# Patient Record
Sex: FEMALE | Race: WHITE | URBAN - METROPOLITAN AREA
[De-identification: names, ages, dates, MRNs, and addresses within clinical notes are randomized per-mention and may not be internally consistent; named-entity substitution may affect disease eponyms.]

---

## 2017-01-13 ENCOUNTER — OFFICE VISIT (OUTPATIENT)
Dept: FAMILY MEDICINE | Facility: CLINIC | Age: 42
End: 2017-01-13
Payer: COMMERCIAL

## 2017-01-13 VITALS
DIASTOLIC BLOOD PRESSURE: 88 MMHG | TEMPERATURE: 97.5 F | BODY MASS INDEX: 28.73 KG/M2 | HEART RATE: 72 BPM | SYSTOLIC BLOOD PRESSURE: 127 MMHG | OXYGEN SATURATION: 100 % | HEIGHT: 62 IN | RESPIRATION RATE: 16 BRPM | WEIGHT: 156.1 LBS

## 2017-01-13 DIAGNOSIS — L84 CALLUS OF FOOT: ICD-10-CM

## 2017-01-13 DIAGNOSIS — R22.9 LOCALIZED SUPERFICIAL SWELLING, MASS, OR LUMP: Primary | ICD-10-CM

## 2017-01-13 PROCEDURE — 99213 OFFICE O/P EST LOW 20 MIN: CPT | Performed by: FAMILY MEDICINE

## 2017-01-13 NOTE — PROGRESS NOTES
"  SUBJECTIVE:                                                    Serena Dickens is a 41 year old female who presents to clinic today for the following health issues:      lump      Duration: x 3 months     Description (location/character/radiation): left heel on the inner side- quarter size. Denies any pain    Intensity:  N/A    Accompanying signs and symptoms: none    History (similar episodes/previous evaluation): None    Precipitating or alleviating factors: None    Therapies tried and outcome: ointment -pressing the area and using a metal object to roll out the area           Patient Active Problem List   Diagnosis     CARDIOVASCULAR SCREENING; LDL GOAL LESS THAN 160       No current outpatient prescriptions on file.           ROS:  CONSTITUTIONAL:NEGATIVE for fever, chills, change in weight  CV: NEGATIVE for chest pain, palpitations or peripheral edema    Has a lump.  No pain.  Has been there about 3 months.  No injury    OBJECTIVE:                                                    /88 mmHg  Pulse 72  Temp(Src) 97.5  F (36.4  C) (Oral)  Resp 16  Ht 5' 1.75\" (1.568 m)  Wt 156 lb 1.6 oz (70.806 kg)  BMI 28.80 kg/m2  SpO2 100%  Body mass index is 28.8 kg/(m^2).  GENERAL APPEARANCE: alert and no distress  MS: Left foot, medial aspect of heel, there is a lump. This is nontender. No erythema. It is in a calloused area.  PSYCH: mentation appears normal and affect normal/bright       ASSESSMENT/PLAN:                                                      Localized superficial swelling, mass, or lump  I suspect this is part of the callus    Callus of foot  Discussed callus care.  Given patient education materials from [employer]. Also see patient instructions.  If not improving, recommend to see Podiatry.  - PODIATRY/FOOT & ANKLE SURGERY REFERRAL    Follow up prn or as previously directed.      Anjelica Hernández MD, MD  [facility]  "

## 2017-01-13 NOTE — NURSING NOTE
"Chief Complaint   Patient presents with     Musculoskeletal Problem     lump on left heel       Initial /88 mmHg  Pulse 72  Temp(Src) 97.5  F (36.4  C) (Oral)  Resp 16  Ht 5' 1.75\" (1.568 m)  Wt 156 lb 1.6 oz (70.806 kg)  BMI 28.80 kg/m2  SpO2 100% Estimated body mass index is 28.8 kg/(m^2) as calculated from the following:    Height as of this encounter: 5' 1.75\" (1.568 m).    Weight as of this encounter: 156 lb 1.6 oz (70.806 kg).  BP completed using cuff size: regular with blood pressure monitor  Nora Nichols CMA      "

## 2017-01-13 NOTE — PATIENT INSTRUCTIONS
What Are Corns and Calluses?    Corns and calluses are your body s response to friction or pressure against the skin. If your foot rubs the inside of your shoe, the affected area of skin thickens. Or, if a bone is not in the normal position, skin caught between bone and shoe or bone and ground builds up. In either case, the outer layer of skin thickens to protect the foot from unusual pressure. In many cases, corns and calluses look bad but are not harmful. However, more severe corns and calluses may become infected, destroy healthy tissue, or affect foot movement.    Corns  Corns usually grow on top of the foot, often at the toe joint. Corns can range from a slight thickening of skin to a painful soft or hard bump. They often form on top of buckled toe joints (hammer toes). If your toes curl under, corns may grow on the tips of the toes. You may also get a corn on the end of a toe if it rubs against your shoe. Corns can also grow between toes, often between the first and second toes.    Calluses  Calluses grow on the bottom of the foot or on the outer edge of a toe or heel. A callus may spread across the ball of your foot. This type of callus is usually due to a problem with a metatarsal (the long bone at the base of a toe, near the ball of the foot). A pinch callus may grow along the outer edge of the heel or the big toe. Some calluses press up into the foot instead of spreading on the outside. A callus may form a central core or plug of tissue where pressure is greatest.    9359-1007 [employer]. [address] [place] [address]. All rights reserved. This information is not intended as a substitute for professional medical care. Always follow your healthcare professional's instructions.        Treating Corns and Calluses  If your corns or calluses are mild, reducing friction may help. Different shoes, moleskin patches, or soft pads may be all the treatment you need. In more severe cases,  treating tissue buildup may require your doctor s care. Sometimes orthoses (custom-made shoe inserts) are prescribed to reduce friction and pressure.    Change Shoes  If you have corns, your doctor may suggest wearing shoes that have more toe room. This way, buckled joints are less likely to be pinched against the top of the shoe. If you have calluses, wearing a cushioned insole, arch support, or heel counter can help reduce friction.  Visit Your Doctor  In some cases, your doctor may trim away the outer layers of skin that make up the corn or callus. For a painful corn, medication may be injected beneath the built-up tissue.  Wear Orthoses  Orthoses are specially made to meet the needs of your feet. They cushion calluses or divert pressure away from these problem areas. Worn as directed, orthoses help limit existing problems and prevent new ones from forming.  If You Need Surgery  If a bone or joint is out of place, certain parts of your foot may be under too much pressure. This can cause severe corns and calluses. In such cases, surgery is often the best way to correct the problem.  Outpatient Procedures  In most cases, surgery to improve bone position is an outpatient procedure. Your doctor may cut away excess bone, reposition prominent bones, or even fuse joints. Sometimes tendons or ligaments are cut to reduce tension on a bone or joint. Your doctor will talk with you about the procedure that is best suited to your needs.    3538-4106 [employer]. [address] [place] [address], [place] [address]. All rights reserved. This information is not intended as a substitute for professional medical care. Always follow your healthcare professional's instructions.

## 2017-01-13 NOTE — MR AVS SNAPSHOT
After Visit Summary   1/13/2017    Serena Dickens    MRN: [id number]           Patient Information     Date Of Birth          1975        Visit Information        Provider Department      1/13/2017 10:30 AM [facility]jel[facility]        Today's Diagnoses     Callus of foot    -  1       Care Instructions      What Are Corns and Calluses?    Corns and calluses are your body s response to friction or pressure against the skin. If your foot rubs the inside of your shoe, the affected area of skin thickens. Or, if a bone is not in the normal position, skin caught between bone and shoe or bone and ground builds up. In either case, the outer layer of skin thickens to protect the foot from unusual pressure. In many cases, corns and calluses look bad but are not harmful. However, more severe corns and calluses may become infected, destroy healthy tissue, or affect foot movement.    Corns  Corns usually grow on top of the foot, often at the toe joint. Corns can range from a slight thickening of skin to a painful soft or hard bump. They often form on top of buckled toe joints (hammer toes). If your toes curl under, corns may grow on the tips of the toes. You may also get a corn on the end of a toe if it rubs against your shoe. Corns can also grow between toes, often between the first and second toes.    Calluses  Calluses grow on the bottom of the foot or on the outer edge of a toe or heel. A callus may spread across the ball of your foot. This type of callus is usually due to a problem with a metatarsal (the long bone at the base of a toe, near the ball of the foot). A pinch callus may grow along the outer edge of the heel or the big toe. Some calluses press up into the foot instead of spreading on the outside. A callus may form a central core or plug of tissue where pressure is greatest.    9025-4352 [employer]. [address] [place] [address]. All  rights reserved. This information is not intended as a substitute for professional medical care. Always follow your healthcare professional's instructions.        Treating Corns and Calluses  If your corns or calluses are mild, reducing friction may help. Different shoes, moleskin patches, or soft pads may be all the treatment you need. In more severe cases, treating tissue buildup may require your doctor s care. Sometimes orthoses (custom-made shoe inserts) are prescribed to reduce friction and pressure.    Change Shoes  If you have corns, your doctor may suggest wearing shoes that have more toe room. This way, buckled joints are less likely to be pinched against the top of the shoe. If you have calluses, wearing a cushioned insole, arch support, or heel counter can help reduce friction.  Visit Your Doctor  In some cases, your doctor may trim away the outer layers of skin that make up the corn or callus. For a painful corn, medication may be injected beneath the built-up tissue.  Wear Orthoses  Orthoses are specially made to meet the needs of your feet. They cushion calluses or divert pressure away from these problem areas. Worn as directed, orthoses help limit existing problems and prevent new ones from forming.  If You Need Surgery  If a bone or joint is out of place, certain parts of your foot may be under too much pressure. This can cause severe corns and calluses. In such cases, surgery is often the best way to correct the problem.  Outpatient Procedures  In most cases, surgery to improve bone position is an outpatient procedure. Your doctor may cut away excess bone, reposition prominent bones, or even fuse joints. Sometimes tendons or ligaments are cut to reduce tension on a bone or joint. Your doctor will talk with you about the procedure that is best suited to your needs.    3925-0932 [employer]. [address] [place] [address], [place] [address]. All rights reserved. This information is not intended  "as a substitute for professional medical care. Always follow your healthcare professional's instructions.              Follow-ups after your visit        Additional Services     PODIATRY/FOOT & ANKLE SURGERY REFERRAL       Your provider has referred you to: [facility] [phone]   [email or web address]    Please be aware that coverage of these services is subject to the terms and limitations of your health insurance plan.  Call member services at your health plan with any benefit or coverage questions.      Please bring the following to your appointment:  >>   Any x-rays, CTs or MRIs which have been performed.  Contact the facility where they were done to arrange for  prior to your scheduled appointment.    >>   List of current medications   >>   This referral request   >>   Any documents/labs given to you for this referral                  Who to contact     If you have questions or need follow up information about today's clinic visit or your schedule please contact [facility] directly at [phone].  Normal or non-critical lab and imaging results will be communicated to you by MyChart, letter or phone within 4 business days after the clinic has received the results. If you do not hear from us within 7 days, please contact the clinic through [employer] or phone. If you have a critical or abnormal lab result, we will notify you by phone as soon as possible.  Submit refill requests through [employer] or call your pharmacy and they will forward the refill request to us. Please allow 3 business days for your refill to be completed.          Additional Information About Your Visit        [employer] Information     [employer] lets you send messages to your doctor, view your test results, renew your prescriptions, schedule appointments and more. To sign up, go to [email or web address] . Click on \"Log in\" on the left side of the screen, which will " "take you to the Welcome page. Then click on \"Sign up Now\" on the right side of the page.     You will be asked to enter the access code listed below, as well as some personal information. Please follow the directions to create your username and password.     Your access code is: [email or web address]  Expires: 2017 11:07 AM     Your access code will  in 90 days. If you need help or a new code, please call your [place] clinic or [phone].        Care EveryWhere ID     This is your Care EveryWhere ID. This could be used by other organizations to access your [place] medical records  [id number]        Your Vitals Were     Pulse Temperature Respirations Height BMI (Body Mass Index) Pulse Oximetry    72 97.5  F (36.4  C) (Oral) 16 5' 1.75\" (1.568 m) 28.80 kg/m2 100%       Blood Pressure from Last 3 Encounters:   17 127/88   10/21/15 116/70    Weight from Last 3 Encounters:   17 156 lb 1.6 oz (70.806 kg)   10/21/15 146 lb 12.8 oz (66.588 kg)              We Performed the Following     PODIATRY/FOOT & ANKLE SURGERY REFERRAL        Primary Care Provider Office Phone # Fax #    Anjelica Hernández [phone]       [facility] [address] [address] [facility] [address]        Thank you!     Thank you for choosing [facility]  for your care. Our goal is always to provide you with excellent care. Hearing back from our patients is one way we can continue to improve our services. Please take a few minutes to complete the written survey that you may receive in the mail after your visit with us. Thank you!             Your Updated Medication List - Protect others around you: Learn how to safely use, store and throw away your medicines at www.disposemymeds.org.      Notice  As of 2017 11:07 AM    You have not been prescribed any medications.      "

## 2017-06-23 ENCOUNTER — OFFICE VISIT (OUTPATIENT)
Dept: FAMILY MEDICINE | Facility: CLINIC | Age: 42
End: 2017-06-23
Payer: COMMERCIAL

## 2017-06-23 VITALS
SYSTOLIC BLOOD PRESSURE: 110 MMHG | HEIGHT: 62 IN | DIASTOLIC BLOOD PRESSURE: 72 MMHG | OXYGEN SATURATION: 98 % | HEART RATE: 75 BPM | BODY MASS INDEX: 28.1 KG/M2 | RESPIRATION RATE: 16 BRPM | WEIGHT: 152.7 LBS | TEMPERATURE: 98 F

## 2017-06-23 DIAGNOSIS — B18.1 VIRAL HEPATITIS B CHRONIC (H): Primary | ICD-10-CM

## 2017-06-23 DIAGNOSIS — Z12.31 ENCOUNTER FOR SCREENING MAMMOGRAM FOR BREAST CANCER: ICD-10-CM

## 2017-06-23 DIAGNOSIS — Z86.2 HISTORY OF ANEMIA: ICD-10-CM

## 2017-06-23 DIAGNOSIS — E28.319 EARLY MENOPAUSE: ICD-10-CM

## 2017-06-23 LAB — AFP SERPL-MCNC: 2.9 UG/L (ref 0–8)

## 2017-06-23 PROCEDURE — 80053 COMPREHEN METABOLIC PANEL: CPT | Performed by: FAMILY MEDICINE

## 2017-06-23 PROCEDURE — 82105 ALPHA-FETOPROTEIN SERUM: CPT | Performed by: FAMILY MEDICINE

## 2017-06-23 PROCEDURE — 36415 COLL VENOUS BLD VENIPUNCTURE: CPT | Performed by: FAMILY MEDICINE

## 2017-06-23 PROCEDURE — 99214 OFFICE O/P EST MOD 30 MIN: CPT | Performed by: FAMILY MEDICINE

## 2017-06-23 NOTE — PROGRESS NOTES
"  SUBJECTIVE:                                                    Serena Dickens is a 42 year old female who presents to clinic today for the following health issues:      Here to follow up with anemia and hepatitis B virus. Check labs.        Problem list and histories reviewed & adjusted, as indicated.  Additional history:   See under ROS     Patient Active Problem List   Diagnosis     CARDIOVASCULAR SCREENING; LDL GOAL LESS THAN 160       No current outpatient prescriptions on file.           Reviewed and updated as needed this visit by clinical staff  Tobacco  Allergies  Med Hx  Surg Hx  Fam Hx  Soc Hx      Reviewed and updated as needed this visit by Provider         ROS:  CONSTITUTIONAL:NEGATIVE for fever, chills, change in weight  RESP:NEGATIVE for significant cough or SOB  CV: NEGATIVE for chest pain, palpitations or peripheral edema  GI: NEGATIVE for nausea, abdominal pain, heartburn, or change in bowel habits  PSYCHIATRIC: NEGATIVE for changes in mood or affect    History of EVERETTE.  Not having periods; stopped 18 months ago.    Notes history of Hepatitis B. She has not been to Hepatologist for awhile; would like to check things. She notes she feels well; has not symptoms and has been busy.       OBJECTIVE:                                                    /72 (BP Location: Right arm, Patient Position: Chair, Cuff Size: Adult Regular)  Pulse 75  Temp 98  F (36.7  C) (Oral)  Resp 16  Ht 5' 1.75\" (1.568 m)  Wt 152 lb 11.2 oz (69.3 kg)  SpO2 98%  BMI 28.16 kg/m2  Body mass index is 28.16 kg/(m^2).  GENERAL APPEARANCE: alert and no distress  RESP: lungs clear to auscultation - no rales, rhonchi or wheezes  CV: regular rates and rhythm  ABDOMEN: soft, nontender, without hepatosplenomegaly or masses and bowel sounds normal  MS: extremities normal- no gross deformities noted  PSYCH: mentation appears normal and affect normal/bright    Hemoglobin   Date Value Ref Range Status   10/21/2015 13.6 " 11.7 - 15.7 g/dL Final   ]    Reviewed her chart. There are copies of letters to her around results from [facility]; no consult notes. I did see dx of Hep B at one time.      ASSESSMENT/PLAN:                                                      Viral hepatitis B chronic (H)  Encourage her to follow up with [facility]. It does look like they have done ultrasound in the past; they may want to do this and have done in their office as they have more specialized software for the liver.  Also not sure if there might be more to be testing for and offering her. She is agreeable to this.  We did decide to get the following labs and send to [facility] so that this may help streamline some of the follow up.  - Comprehensive metabolic panel  - AFP tumor marker  - GASTROENTEROLOGY ADULT REF CONSULT ONLY      History of anemia  She has not had periods for awhile.  Last blood count normal.   (will see if lab can add on a hemoglobin/cbc to labs; did not order as intended).    Early menopause:  Briefly discussed this. She was not concerned. Discussed taking vitamin D around bone health.    Encounter for screening mammogram for breast cancer    - *MA Screening Digital Bilateral; Future        Patient Instructions       I would recommend taking 1000 iu vitamin D3 daily.      CC [facility] with results and note.    Anjelica Hernández MD, MD  [facility]

## 2017-06-23 NOTE — NURSING NOTE
"Chief Complaint   Patient presents with     Anemia     follow up     Hepatatitis b       Initial /72 (BP Location: Right arm, Patient Position: Chair, Cuff Size: Adult Regular)  Pulse 75  Temp 98  F (36.7  C) (Oral)  Resp 16  Ht 5' 1.75\" (1.568 m)  Wt 152 lb 11.2 oz (69.3 kg)  SpO2 98%  BMI 28.16 kg/m2 Estimated body mass index is 28.16 kg/(m^2) as calculated from the following:    Height as of this encounter: 5' 1.75\" (1.568 m).    Weight as of this encounter: 152 lb 11.2 oz (69.3 kg).  Medication Reconciliation: complete   Nora Nichols, CASSIE      "

## 2017-06-23 NOTE — MR AVS SNAPSHOT
After Visit Summary   6/23/2017    Serena Dickens    MRN: [id number]           Patient Information     Date Of Birth          1975        Visit Information        Provider Department      6/23/2017 9:10 AM Anjelica Hernández MD [facility]        Today's Diagnoses     Viral hepatitis B chronic (H)    -  1    Encounter for screening mammogram for breast cancer        History of anemia          Care Instructions        I would recommend taking 1000 iu vitamin D3 daily.          Follow-ups after your visit        Additional Services     GASTROENTEROLOGY ADULT REF CONSULT ONLY       Preferred Location: [facility] [phone]      Please be aware that coverage of these services is subject to the terms and limitations of your health insurance plan.  Call member services at your health plan with any benefit or coverage questions.  Any procedures must be performed at a [facility] facility OR coordinated by your clinic's referral office.    Please bring the following with you to your appointment:    (1) Any X-Rays, CTs or MRIs which have been performed.  Contact the facility where they were done to arrange for  prior to your scheduled appointment.    (2) List of current medications   (3) This referral request   (4) Any documents/labs given to you for this referral                  Future tests that were ordered for you today     Open Future Orders        Priority Expected Expires Ordered    *MA Screening Digital Bilateral Routine  6/23/2018 6/23/2017            Who to contact     If you have questions or need follow up information about today's clinic visit or your schedule please contact [facility] directly at [phone].  Normal or non-critical lab and imaging results will be communicated to you by MyChart, letter or phone within 4 business days after the clinic has received the results. If you do not hear from us within 7 days, please contact the clinic through  "[employer]AN[employer] or phone. If you have a critical or abnormal lab result, we will notify you by phone as soon as possible.  Submit refill requests through [employer] or call your pharmacy and they will forward the refill request to us. Please allow 3 business days for your refill to be completed.          Additional Information About Your Visit        JANZZhart Information     [employer] lets you send messages to your doctor, view your test results, renew your prescriptions, schedule appointments and more. To sign up, go to [email or web address] . Click on \"Log in\" on the left side of the screen, which will take you to the Welcome page. Then click on \"Sign up Now\" on the right side of the page.     You will be asked to enter the access code listed below, as well as some personal information. Please follow the directions to create your username and password.     Your access code is: [email or web address]  Expires: 2017  9:46 AM     Your access code will  in 90 days. If you need help or a new code, please call your [place] clinic or [phone].        Care EveryWhere ID     This is your Care EveryWhere ID. This could be used by other organizations to access your [place] medical records  [id number]        Your Vitals Were     Pulse Temperature Respirations Height Pulse Oximetry BMI (Body Mass Index)    75 98  F (36.7  C) (Oral) 16 5' 1.75\" (1.568 m) 98% 28.16 kg/m2       Blood Pressure from Last 3 Encounters:   17 110/72   17 127/88   10/21/15 116/70    Weight from Last 3 Encounters:   17 152 lb 11.2 oz (69.3 kg)   17 156 lb 1.6 oz (70.8 kg)   10/21/15 146 lb 12.8 oz (66.6 kg)              We Performed the Following     AFP tumor marker     Comprehensive metabolic panel     GASTROENTEROLOGY ADULT REF CONSULT ONLY        Primary Care Provider Office Phone # Fax #    Anjelica Hernández [phone]       [facility] [address] [facility]  [facility] [address]        Equal Access to " Services     [facility]: Hadii marcin Frances, waenedinada luqadaha, qaybta kaalmamaurisio vang, leslie carvalho. So [facility] [phone].    ATENCIÓN: Si habla español, tiene a hunt disposición servicios gratuitos de asistencia lingüística. Llame al [phone].    We comply with applicable federal civil rights laws and [place] laws. We do not discriminate on the basis of race, color, national origin, age, disability sex, sexual orientation or gender identity.            Thank you!     Thank you for choosing [facility]  for your care. Our goal is always to provide you with excellent care. Hearing back from our patients is one way we can continue to improve our services. Please take a few minutes to complete the written survey that you may receive in the mail after your visit with us. Thank you!             Your Updated Medication List - Protect others around you: Learn how to safely use, store and throw away your medicines at www.disposemymeds.org.      Notice  As of 6/23/2017  9:46 AM    You have not been prescribed any medications.

## 2017-06-24 LAB
ALBUMIN SERPL-MCNC: 3.6 G/DL (ref 3.4–5)
ALP SERPL-CCNC: 98 U/L (ref 40–150)
ALT SERPL W P-5'-P-CCNC: 89 U/L (ref 0–50)
ANION GAP SERPL CALCULATED.3IONS-SCNC: 5 MMOL/L (ref 3–14)
AST SERPL W P-5'-P-CCNC: 50 U/L (ref 0–45)
BILIRUB SERPL-MCNC: 0.9 MG/DL (ref 0.2–1.3)
BUN SERPL-MCNC: 9 MG/DL (ref 7–30)
CALCIUM SERPL-MCNC: 8.6 MG/DL (ref 8.5–10.1)
CHLORIDE SERPL-SCNC: 105 MMOL/L (ref 94–109)
CO2 SERPL-SCNC: 30 MMOL/L (ref 20–32)
CREAT SERPL-MCNC: 0.73 MG/DL (ref 0.52–1.04)
GFR SERPL CREATININE-BSD FRML MDRD: 87 ML/MIN/1.7M2
GLUCOSE SERPL-MCNC: 109 MG/DL (ref 70–99)
POTASSIUM SERPL-SCNC: 3.7 MMOL/L (ref 3.4–5.3)
PROT SERPL-MCNC: 7.7 G/DL (ref 6.8–8.8)
SODIUM SERPL-SCNC: 140 MMOL/L (ref 133–144)

## 2017-09-19 ENCOUNTER — TRANSFERRED RECORDS (OUTPATIENT)
Dept: HEALTH INFORMATION MANAGEMENT | Facility: CLINIC | Age: 42
End: 2017-09-19

## 2017-09-19 LAB — INR PPP: 1 (ref 1–1.2)

## 2017-10-13 ENCOUNTER — TRANSFERRED RECORDS (OUTPATIENT)
Dept: HEALTH INFORMATION MANAGEMENT | Facility: CLINIC | Age: 42
End: 2017-10-13

## 2017-10-19 ENCOUNTER — TRANSFERRED RECORDS (OUTPATIENT)
Dept: HEALTH INFORMATION MANAGEMENT | Facility: CLINIC | Age: 42
End: 2017-10-19

## 2017-10-20 ENCOUNTER — ALLIED HEALTH/NURSE VISIT (OUTPATIENT)
Dept: NURSING | Facility: CLINIC | Age: 42
End: 2017-10-20
Payer: COMMERCIAL

## 2017-10-20 DIAGNOSIS — Z23 NEED FOR PROPHYLACTIC VACCINATION AND INOCULATION AGAINST INFLUENZA: Primary | ICD-10-CM

## 2017-10-20 PROCEDURE — 90471 IMMUNIZATION ADMIN: CPT

## 2017-10-20 PROCEDURE — 99207 ZZC NO CHARGE NURSE ONLY: CPT

## 2017-10-20 PROCEDURE — 90686 IIV4 VACC NO PRSV 0.5 ML IM: CPT

## 2017-10-20 NOTE — MR AVS SNAPSHOT
"              After Visit Summary   10/20/2017    Serena Dickens    MRN: [id number]           Patient Information     Date Of Birth          1975        Visit Information        Provider Department      10/20/2017 11:30 AM  NURSE [facility] [place]        Today's Diagnoses     Need for prophylactic vaccination and inoculation against influenza    -  1       Follow-ups after your visit        Your next 10 appointments already scheduled     Oct 20, 2017 11:30 AM CDT   Nurse Only with  NURSE   [facility] ([facility])    [address] [facility] [address] [phone]            Oct 27, 2017  1:50 PM CDT   PHYSICAL with Anjelica Hernández MD   [facility] ([facility])    [address] [facility] [address] [facility] [address] [phone]              Who to contact     If you have questions or need follow up information about today's clinic visit or your schedule please contact [facility] directly at [phone].  Normal or non-critical lab and imaging results will be communicated to you by [email or web address], letter or phone within 4 business days after the clinic has received the results. If you do not hear from us within 7 days, please contact the clinic through [employer] or phone. If you have a critical or abnormal lab result, we will notify you by phone as soon as possible.  Submit refill requests through RapidMind or call your pharmacy and they will forward the refill request to us. Please allow 3 business days for your refill to be completed.          Additional Information About Your Visit        [email or web address] Information     RapidMind lets you send messages to your doctor, view your test results, renew your prescriptions, schedule appointments and more. To sign up, go to [email or web address] . Click on \"Log in\" on the left side of the screen, which will take you to the Welcome page. Then click on \"Sign up Now\" on the right " side of the page.     You will be asked to enter the access code listed below, as well as some personal information. Please follow the directions to create your username and password.     Your access code is: [email or web address]  Expires: 2018  8:46 AM     Your access code will  in 90 days. If you need help or a new code, please call your [place] clinic or [phone].        Care EveryWhere ID     This is your Care EveryWhere ID. This could be used by other organizations to access your [place] medical records  [id number]         Blood Pressure from Last 3 Encounters:   17 110/72   17 127/88   10/21/15 116/70    Weight from Last 3 Encounters:   17 152 lb 11.2 oz (69.3 kg)   17 156 lb 1.6 oz (70.8 kg)   10/21/15 146 lb 12.8 oz (66.6 kg)              We Performed the Following     FLU VAC, SPLIT VIRUS IM > 3 YO (QUADRIVALENT) [12312]     Vaccine Administration, Initial [94483]        Primary Care Provider Office Phone # Fax #    Anjelica Hernández [phone] [address] [facility] [address]        Equal Access to Services     AIX[facility]ITT[facility]: Hadii aad ku hadasho Soomaali, waaxda luqadaha, qaybta kaalmada adeegyada, waxay [place] haypete carvalho. So [facility] [phone].    ATENCIÓN: Si habla español, tiene a hunt disposición servicios gratuitos de asistencia lingüística. Llame al [phone].    We comply with applicable federal civil rights laws and [place] laws. We do not discriminate on the basis of race, color, national origin, age, disability, sex, sexual orientation, or gender identity.            Thank you!     Thank you for choosing [facility]  for your care. Our goal is always to provide you with excellent care. Hearing back from our patients is one way we can continue to improve our services. Please take a few minutes to complete the written survey that you may receive in the mail after your visit with us. Thank you!              Your Updated Medication List - Protect others around you: Learn how to safely use, store and throw away your medicines at www.disposemymeds.org.      Notice  As of 10/20/2017  8:46 AM    You have not been prescribed any medications.

## 2017-10-20 NOTE — PROGRESS NOTES

## 2017-10-30 NOTE — PROGRESS NOTES
Itinerary:  [place]  Departure Date: 11/10/2017, Return Date: 11/28/2017  Reason for Travel (i.e. business, pleasure): visiting family member  Visiting an urban or rural area? [place], [facility]  Accommodations (i.e. hotel, hostel, friends, family etc.): family  Women - First day of your last period: NA    IMMUNIZATION HISTORY  Have you received any vaccinations in the past 4 weeks?  No   Have you ever fainted from having your blood drawn or from an injection?  No  Have you ever had a fever reaction to a vaccination?  No  Have you ever had any bad reaction / side effect from any vaccination?  No  Have you ever had hepatitis A or B vaccine?  No  Do you live (or work closely with anyone who has AIDS, or any other immune disorder, or who is on chemotherapy for cancer or family history of immunodeficiency?  No  Have you received any injection of immune globulin or any blood products during the past 12 months?  No    GENERAL MEDICAL HISTORY  Do you have a medical condition that warrants maintenance medication or physician follow-up?  No  Do you have a medical condition that is stable now, but that may recur while traveling?  No  Has your spleen been removed?   No  Have you had an acute illness or a fever in the past 48 hours?  No  Are you pregnant or might you become pregnant on this trip? Any chance of pregnancy?  No  Are you breastfeeding?  No  Do you have HIV, AIDS, an AIDS-like condition, any other immune disorder, leukemia or cancer?  No  Do you have a severe combined immunodeficiency disease?  No  Have you had your thymus gland removed or a history of problems with your thymus, such as myasthenia gravis, DiGeorge syndrome, or thymoma?  No  Do you have severe thrombocytopenia (low platelet count) or blood clotting disorder?  No  Have you ever had a convulsion, seizure, epilepsy, neurologic condition or brain infection?  No  Do you have any stomach conditions?  No  Do you have a G6PD deficiency?  No  Do you have  severe renal or kidney impairment?  No  Do you have a history of psychiatric problems?  No  Do you have a problem with strange dreams and/or nightmares?  No  Do you have insomnia?  No  Do you have problems with vaginitis?  No  Do you have psoriasis?  No  Are you prone to motion sickness?  No  Have you ever had headaches, nausea, vomiting or breathing problems from altitude exposure?  No    MEDICATIONS  ARE YOU TAKING:  Steroids, prednisone, or anti-cancer drugs?  No  Antibiotics or sulfonamides?  No  Oral contraceptives?  No  Aspirin therapy? (children & adolescents)  No    ALLERGIES  ARE YOU ALLERGIC TO:  Any medications?  No  Any foods or other?  No     Immunizations discussed include: Hepatitis A and Typhoid  Malaraia prophylaxis recommended: Malarone  Symptomatic treatment for traveler's diarrhea: ciprofloxacin    SUBJECTIVE: Serena Dickens , a 42 year old  female, presents for counseling and information regarding upcoming travel to [facility]. Special medical concerns include: none. She anticipates the following unusual   exposures: none.    Past Medical History:   Diagnosis Date     Hepatitis B       Immunization History   Administered Date(s) Administered     HepA-Adult 07/11/2014, 10/31/2017     Influenza (IIV3) 11/28/2003, 10/17/2005, 11/29/2006, 11/16/2007, 10/10/2008, 11/24/2010, 10/19/2011     Influenza Vaccine IM 3yrs+ 4 Valent IIV4 10/21/2015, 10/20/2017     Meningococcal (Menactra ) 07/11/2014     Poliovirus, inactivated (IPV) 07/11/2014     TDAP Vaccine (Adacel) 10/10/2012     Tdap (Adacel,Boostrix) 05/09/2011     Typhoid IM 07/11/2014, 10/31/2017     Yellow Fever, unspecified 07/11/2014       Current Outpatient Prescriptions   Medication Sig Dispense Refill     atovaquone-proguanil (MALARONE) 250-100 MG per tablet Take 1 tablet by mouth daily Start 2 days before travel and continue 7 days after return. 30 tablet 0     ciprofloxacin (CIPRO) 500 MG tablet Take 1 tablet (500 mg) by mouth 2 times  daily x3 days for traveler's diarrhea 6 tablet 0     No Known Allergies     EXAM: deferred    ASSESSMENT/PLAN:  (Z71.89) Travel advice encounter  (primary encounter diagnosis)  Plan: atovaquone-proguanil (MALARONE) 250-100 MG per         tablet, HEPA VACCINE ADULT IM, TYPHOID VACCINE,        IM      (A09) Traveler's diarrhea  Plan: ciprofloxacin (CIPRO) 500 MG tablet      I have reviewed general recommendations for safe travel   including: food/water precautions, insect avoidance, safe sex   practices given high prevalence of HIV and other STDs,   roadway safety. Educational materials and links to the [employer]  Traveler's health website have been provided.    Total time 30 minutes, greater than 50 percent in counseling   and coordination of care.       Carrol Ortiz MD   [facility]

## 2017-10-31 ENCOUNTER — OFFICE VISIT (OUTPATIENT)
Dept: FAMILY MEDICINE | Facility: CLINIC | Age: 42
End: 2017-10-31
Payer: COMMERCIAL

## 2017-10-31 VITALS
RESPIRATION RATE: 16 BRPM | HEIGHT: 63 IN | DIASTOLIC BLOOD PRESSURE: 67 MMHG | SYSTOLIC BLOOD PRESSURE: 110 MMHG | TEMPERATURE: 98 F | WEIGHT: 149 LBS | HEART RATE: 61 BPM | BODY MASS INDEX: 26.4 KG/M2

## 2017-10-31 DIAGNOSIS — A09 TRAVELER'S DIARRHEA: ICD-10-CM

## 2017-10-31 DIAGNOSIS — Z71.84 TRAVEL ADVICE ENCOUNTER: Primary | ICD-10-CM

## 2017-10-31 PROCEDURE — 90471 IMMUNIZATION ADMIN: CPT | Performed by: FAMILY MEDICINE

## 2017-10-31 PROCEDURE — 90632 HEPA VACCINE ADULT IM: CPT | Performed by: FAMILY MEDICINE

## 2017-10-31 PROCEDURE — 99402 PREV MED CNSL INDIV APPRX 30: CPT | Mod: 25 | Performed by: FAMILY MEDICINE

## 2017-10-31 PROCEDURE — 90691 TYPHOID VACCINE IM: CPT | Performed by: FAMILY MEDICINE

## 2017-10-31 PROCEDURE — 90472 IMMUNIZATION ADMIN EACH ADD: CPT | Performed by: FAMILY MEDICINE

## 2017-10-31 RX ORDER — CIPROFLOXACIN 500 MG/1
500 TABLET, FILM COATED ORAL 2 TIMES DAILY
Qty: 6 TABLET | Refills: 0 | Status: SHIPPED | OUTPATIENT
Start: 2017-10-31 | End: 2019-02-01

## 2017-10-31 RX ORDER — ATOVAQUONE AND PROGUANIL HYDROCHLORIDE 250; 100 MG/1; MG/1
1 TABLET, FILM COATED ORAL DAILY
Qty: 30 TABLET | Refills: 0 | Status: SHIPPED | OUTPATIENT
Start: 2017-10-31 | End: 2019-02-01

## 2017-10-31 NOTE — NURSING NOTE
"Chief Complaint   Patient presents with     Travel Clinic     pt traveling to [facility], date of departure 11/10/2010 and returning 11/28/2017       Initial /67 (BP Location: Right arm, Patient Position: Chair, Cuff Size: Adult Regular)  Pulse 61  Temp 98  F (36.7  C) (Oral)  Resp 16  Ht 5' 3\" (1.6 m)  Wt 149 lb (67.6 kg)  Breastfeeding? No  BMI 26.39 kg/m2 Estimated body mass index is 26.39 kg/(m^2) as calculated from the following:    Height as of this encounter: 5' 3\" (1.6 m).    Weight as of this encounter: 149 lb (67.6 kg).  Medication Reconciliation: complete     Shannon Rowley/CASSIE  [place]      "

## 2017-10-31 NOTE — MR AVS SNAPSHOT
"              After Visit Summary   10/31/2017    Serena Dickens    MRN: [id number]           Patient Information     Date Of Birth          1975        Visit Information        Provider Department      10/31/2017 7:00 AM Carrol Campuzano MD [facility]        Today's Diagnoses     Travel advice encounter    -  1    Traveler's diarrhea          Care Instructions    See handout provided          Follow-ups after your visit        Who to contact     If you have questions or need follow up information about today's clinic visit or your schedule please contact [facility] directly at [phone].  Normal or non-critical lab and imaging results will be communicated to you by Voci Technologieshart, letter or phone within 4 business days after the clinic has received the results. If you do not hear from us within 7 days, please contact the clinic through Voci Technologieshart or phone. If you have a critical or abnormal lab result, we will notify you by phone as soon as possible.  Submit refill requests through MediSens or call your pharmacy and they will forward the refill request to us. Please allow 3 business days for your refill to be completed.          Additional Information About Your Visit        MyChart Information     MediSens lets you send messages to your doctor, view your test results, renew your prescriptions, schedule appointments and more. To sign up, go to [email or web address] . Click on \"Log in\" on the left side of the screen, which will take you to the Welcome page. Then click on \"Sign up Now\" on the right side of the page.     You will be asked to enter the access code listed below, as well as some personal information. Please follow the directions to create your username and password.     Your access code is: [email or web address]  Expires: 2018  8:46 AM     Your access code will  in 90 days. If you need help or a new code, please call your [facility] or " "[phone].        Care EveryWhere ID     This is your Care EveryWhere ID. This could be used by other organizations to access your [place] medical records  [id number]        Your Vitals Were     Pulse Temperature Respirations Height Breastfeeding? BMI (Body Mass Index)    61 98  F (36.7  C) (Oral) 16 5' 3\" (1.6 m) No 26.39 kg/m2       Blood Pressure from Last 3 Encounters:   10/31/17 110/67   06/23/17 110/72   01/13/17 127/88    Weight from Last 3 Encounters:   10/31/17 149 lb (67.6 kg)   06/23/17 152 lb 11.2 oz (69.3 kg)   01/13/17 156 lb 1.6 oz (70.8 kg)              Today, you had the following     No orders found for display         Today's Medication Changes          These changes are accurate as of: 10/31/17  7:35 AM.  If you have any questions, ask your nurse or doctor.               Start taking these medicines.        Dose/Directions    atovaquone-proguanil 250-100 MG per tablet   Commonly known as:  MALARONE   Used for:  Travel advice encounter   Started by:  Carrol Campuzano MD        Dose:  1 tablet   Take 1 tablet by mouth daily Start 2 days before travel and continue 7 days after return.   Quantity:  30 tablet   Refills:  0       ciprofloxacin 500 MG tablet   Commonly known as:  CIPRO   Used for:  Traveler's diarrhea   Started by:  Carrol Campuzano MD        Dose:  500 mg   Take 1 tablet (500 mg) by mouth 2 times daily x3 days for traveler's diarrhea   Quantity:  6 tablet   Refills:  0            Where to get your medicines      [facility] [address] [facility]  [address] [address] [place] [address]     Phone:  [phone]     atovaquone-proguanil 250-100 MG per tablet    ciprofloxacin 500 MG tablet                Primary Care Provider Office Phone # Fax #    Anjelica Hernández [phone] [address] [place] [address]        Equal Access to " Services     [facility]: Hadii aad ku hadronniefatimah Nustone, waaxda luqadaha, qaybta kaalmada ciera, leslie carvalho. So [facility] [phone].    ATENCIÓN: Si habla espellis, tiene a hunt disposición servicios gratuitos de asistencia lingüística. Llame al [phone].    We comply with applicable federal civil rights laws and [place] laws. We do not discriminate on the basis of race, color, national origin, age, disability, sex, sexual orientation, or gender identity.            Thank you!     Thank you for choosing [facility]  for your care. Our goal is always to provide you with excellent care. Hearing back from our patients is one way we can continue to improve our services. Please take a few minutes to complete the written survey that you may receive in the mail after your visit with us. Thank you!             Your Updated Medication List - Protect others around you: Learn how to safely use, store and throw away your medicines at www.disposemymeds.org.          This list is accurate as of: 10/31/17  7:35 AM.  Always use your most recent med list.                   Brand Name Dispense Instructions for use Diagnosis    atovaquone-proguanil 250-100 MG per tablet    MALARONE    30 tablet    Take 1 tablet by mouth daily Start 2 days before travel and continue 7 days after return.    Travel advice encounter       ciprofloxacin 500 MG tablet    CIPRO    6 tablet    Take 1 tablet (500 mg) by mouth 2 times daily x3 days for traveler's diarrhea    Traveler's diarrhea

## 2018-01-18 ENCOUNTER — TELEPHONE (OUTPATIENT)
Dept: FAMILY MEDICINE | Facility: CLINIC | Age: 43
End: 2018-01-18

## 2018-04-12 ENCOUNTER — TRANSFERRED RECORDS (OUTPATIENT)
Dept: HEALTH INFORMATION MANAGEMENT | Facility: CLINIC | Age: 43
End: 2018-04-12

## 2018-04-30 ENCOUNTER — TRANSFERRED RECORDS (OUTPATIENT)
Dept: HEALTH INFORMATION MANAGEMENT | Facility: CLINIC | Age: 43
End: 2018-04-30

## 2018-05-17 ENCOUNTER — TRANSFERRED RECORDS (OUTPATIENT)
Dept: HEALTH INFORMATION MANAGEMENT | Facility: CLINIC | Age: 43
End: 2018-05-17

## 2018-10-19 ENCOUNTER — TRANSFERRED RECORDS (OUTPATIENT)
Dept: HEALTH INFORMATION MANAGEMENT | Facility: CLINIC | Age: 43
End: 2018-10-19

## 2018-10-29 ENCOUNTER — TRANSFERRED RECORDS (OUTPATIENT)
Dept: HEALTH INFORMATION MANAGEMENT | Facility: CLINIC | Age: 43
End: 2018-10-29

## 2019-02-01 ENCOUNTER — OFFICE VISIT (OUTPATIENT)
Dept: FAMILY MEDICINE | Facility: CLINIC | Age: 44
End: 2019-02-01
Payer: COMMERCIAL

## 2019-02-01 VITALS
BODY MASS INDEX: 28.29 KG/M2 | HEIGHT: 62 IN | DIASTOLIC BLOOD PRESSURE: 72 MMHG | RESPIRATION RATE: 16 BRPM | OXYGEN SATURATION: 99 % | WEIGHT: 153.7 LBS | SYSTOLIC BLOOD PRESSURE: 112 MMHG | TEMPERATURE: 98.1 F | HEART RATE: 70 BPM

## 2019-02-01 DIAGNOSIS — Z13.6 CARDIOVASCULAR SCREENING; LDL GOAL LESS THAN 160: ICD-10-CM

## 2019-02-01 DIAGNOSIS — Z12.4 SCREENING FOR CERVICAL CANCER: ICD-10-CM

## 2019-02-01 DIAGNOSIS — Z00.00 ENCOUNTER FOR ROUTINE ADULT HEALTH EXAMINATION WITHOUT ABNORMAL FINDINGS: Primary | ICD-10-CM

## 2019-02-01 DIAGNOSIS — Z13.1 SCREENING FOR DIABETES MELLITUS: ICD-10-CM

## 2019-02-01 DIAGNOSIS — E61.1 LOW IRON: ICD-10-CM

## 2019-02-01 DIAGNOSIS — Z12.31 ENCOUNTER FOR SCREENING MAMMOGRAM FOR BREAST CANCER: ICD-10-CM

## 2019-02-01 DIAGNOSIS — B18.1 VIRAL HEPATITIS B CHRONIC (H): ICD-10-CM

## 2019-02-01 PROCEDURE — 99396 PREV VISIT EST AGE 40-64: CPT | Performed by: FAMILY MEDICINE

## 2019-02-01 PROCEDURE — 87624 HPV HI-RISK TYP POOLED RSLT: CPT | Performed by: FAMILY MEDICINE

## 2019-02-01 PROCEDURE — G0145 SCR C/V CYTO,THINLAYER,RESCR: HCPCS | Performed by: FAMILY MEDICINE

## 2019-02-01 RX ORDER — TENOFOVIR ALAFENAMIDE 25 MG/1
25 TABLET ORAL DAILY
COMMUNITY
Start: 2019-01-24

## 2019-02-01 ASSESSMENT — ENCOUNTER SYMPTOMS
JOINT SWELLING: 0
HEADACHES: 0
MYALGIAS: 0
FEVER: 0
ARTHRALGIAS: 0
PARESTHESIAS: 0
DYSURIA: 0
SORE THROAT: 0
CHILLS: 0
SHORTNESS OF BREATH: 0
HEMATOCHEZIA: 0
CONSTIPATION: 0
DIARRHEA: 0
NERVOUS/ANXIOUS: 0
BREAST MASS: 0
COUGH: 0
HEARTBURN: 0
NAUSEA: 0
EYE PAIN: 0
DIZZINESS: 0
ABDOMINAL PAIN: 0
HEMATURIA: 0
FREQUENCY: 0
WEAKNESS: 0
PALPITATIONS: 0

## 2019-02-01 ASSESSMENT — MIFFLIN-ST. JEOR: SCORE: 1297.49

## 2019-02-01 NOTE — PROGRESS NOTES
SUBJECTIVE:   CC: Serena Dickens is an 43 year old woman who presents for preventive health visit.     Physical   Annual:     Getting at least 3 servings of Calcium per day:  NO    Bi-annual eye exam:  NO    Dental care twice a year:  Yes    Sleep apnea or symptoms of sleep apnea:  None    Diet:  Regular (no restrictions)    Frequency of exercise:  1 day/week    Duration of exercise:  Less than 15 minutes    Additional concerns today:  Yes    PHQ-2 Total Score: 0              Today's PHQ-2 Score:   PHQ-2 ( 1999 Pfizer) 2/1/2019   Q1: Little interest or pleasure in doing things 0   Q2: Feeling down, depressed or hopeless 0   PHQ-2 Score 0   Q1: Little interest or pleasure in doing things Not at all   Q2: Feeling down, depressed or hopeless Not at all   PHQ-2 Score 0       Abuse: Current or Past(Physical, Sexual or Emotional)- No  Do you feel safe in your environment? Yes    Social History     Tobacco Use     Smoking status: Never Smoker     Smokeless tobacco: Never Used   Substance Use Topics     Alcohol use: No     Alcohol Use 2/1/2019   If you drink alcohol do you typically have greater than 3 drinks per day OR greater than 7 drinks per week? No       Reviewed orders with patient.  Reviewed health maintenance and updated orders accordingly - Yes          Pertinent mammograms are reviewed under the imaging tab.  History of abnormal Pap smear: NO - age 30-65 PAP every 5 years with negative HPV co-testing recommended  PAP / HPV 10/21/2015   PAP NIL     Reviewed and updated as needed this visit by clinical staff         Reviewed and updated as needed this visit by Provider        Patient Active Problem List   Diagnosis     CARDIOVASCULAR SCREENING; LDL GOAL LESS THAN 160     Viral hepatitis B chronic (H)       Past Medical History:   Diagnosis Date     Hepatitis B        Past Surgical History:   Procedure Laterality Date     SURGICAL HISTORY OF -       C/section x 2       Obstetric History     No data  available          Current Outpatient Medications   Medication Sig Dispense Refill     VEMLIDY 25 MG tablet Take 25 mg by mouth daily         Family History   Problem Relation Age of Onset     Asthma Mother      Diabetes Father      Hypertension Father        Social History     Tobacco Use     Smoking status: Never Smoker     Smokeless tobacco: Never Used   Substance Use Topics     Alcohol use: No       Immunization History   Administered Date(s) Administered     HepA-Adult 07/11/2014, 10/31/2017     Influenza (IIV3) PF 11/28/2003, 10/17/2005, 11/29/2006, 11/16/2007, 10/10/2008, 11/24/2010, 10/19/2011     Influenza Vaccine IM 3yrs+ 4 Valent IIV4 10/21/2015, 10/20/2017     Meningococcal (Menactra ) 07/11/2014     Poliovirus, inactivated (IPV) 07/11/2014     TDAP Vaccine (Adacel) 10/10/2012     Tdap (Adacel,Boostrix) 05/09/2011     Typhoid IM 07/11/2014, 10/31/2017     Yellow Fever, unspecified 07/11/2014         Review of Systems   Constitutional: Negative for chills and fever.   HENT: Negative for congestion, ear pain, hearing loss and sore throat.    Eyes: Negative for pain and visual disturbance.   Respiratory: Negative for cough and shortness of breath.    Cardiovascular: Negative for chest pain, palpitations and peripheral edema.   Gastrointestinal: Negative for abdominal pain, constipation, diarrhea, heartburn, hematochezia and nausea.   Breasts:  Negative for tenderness, breast mass and discharge.   Genitourinary: Negative for dysuria, frequency, genital sores, hematuria, pelvic pain, urgency, vaginal bleeding and vaginal discharge.   Musculoskeletal: Negative for arthralgias, joint swelling and myalgias.   Skin: Negative for rash.   Neurological: Negative for dizziness, weakness, headaches and paresthesias.   Psychiatric/Behavioral: Negative for mood changes. The patient is not nervous/anxious.      No menses for a couple years. Denies hot flashes or other.        OBJECTIVE:   /72 (BP Location: Right  "arm, Cuff Size: Adult Regular)   Pulse 70   Temp 98.1  F (36.7  C) (Oral)   Resp 16   Ht 1.562 m (5' 1.5\")   Wt 69.7 kg (153 lb 11.2 oz)   SpO2 99%   BMI 28.57 kg/m    Physical Exam  GENERAL APPEARANCE: alert and no distress  EYES: Eyes grossly normal to inspection, PERRL and conjunctivae and sclerae normal  HENT: ear canals and TM's normal, nose and mouth without ulcers or lesions, oropharynx clear and oral mucous membranes moist  NECK: no adenopathy, no asymmetry, masses, or scars and thyroid normal to palpation  RESP: lungs clear to auscultation - no rales, rhonchi or wheezes  BREAST: normal without masses, tenderness or nipple discharge and no palpable axillary masses or adenopathy  CV: regular rates and rhythm, no peripheral edema and peripheral pulses strong  ABDOMEN: soft, nontender, no hepatosplenomegaly, no masses and bowel sounds normal  MS: no musculoskeletal defects are noted and gait is age appropriate without ataxia  SKIN: no suspicious lesions or rashes  NEURO: Normal strength and tone, sensory exam grossly normal, mentation intact and speech normal  PSYCH: mentation appears normal and affect normal/bright      Recent Labs   Lab Test 11/24/10   CHOL 153   HDL 47   LDL 97   TRIG 46   CHOLHDLRATIO 3.26           ASSESSMENT/PLAN:     1. Encounter for routine adult health examination without abnormal findings      2. Low iron  She is asking if OK to take iron. She has been low in the past.   She is not having menses any longer. Discussed she may not need iron. Also discussed there is some risk to possible iron overload. Will plan to check.  - **CBC with platelets FUTURE anytime; Future  - Ferritin; Future    3. Viral hepatitis B chronic (H)  She does see [facility] and is on treatment.     4. Screening for cervical cancer    - Pap imaged thin layer screen with HPV - recommended age 30 - 65  - HPV High Risk Types DNA Cervical    5. Encounter for screening mammogram for breast cancer    - MA Screening " "Digital Bilateral; Future    6. CARDIOVASCULAR SCREENING; LDL GOAL LESS THAN 160    - Lipid panel reflex to direct LDL Fasting; Future    7. Screening for diabetes mellitus    - **Glucose FUTURE anytime; Future      COUNSELING:  Reviewed preventive health counseling, as reflected in patient instructions       Regular exercise       Healthy diet/nutrition    BP Readings from Last 1 Encounters:   02/01/19 112/72     Estimated body mass index is 28.57 kg/m  as calculated from the following:    Height as of this encounter: 1.562 m (5' 1.5\").    Weight as of this encounter: 69.7 kg (153 lb 11.2 oz).      Weight management plan: Discussed healthy diet and exercise guidelines     reports that  has never smoked. she has never used smokeless tobacco.      Counseling Resources:  ATP IV Guidelines  Pooled Cohorts Equation Calculator  Breast Cancer Risk Calculator  FRAX Risk Assessment  ICSI Preventive Guidelines  Dietary Guidelines for Americans, 2010  USDA's MyPlate  ASA Prophylaxis  Lung CA Screening    Anjelica Hernández MD, MD  [facility]  "

## 2019-02-01 NOTE — PATIENT INSTRUCTIONS
Preventive Health Recommendations  Female Ages 40 to 49    Yearly exam:     See your health care provider every year in order to  1. Review health changes.   2. Discuss preventive care.    3. Review your medicines if your doctor prescribed any.      Get a Pap test every three years (unless you have an abnormal result and your provider advises testing more often).      If you get Pap tests with HPV test, you only need to test every 5 years, unless you have an abnormal result. You do not need a Pap test if your uterus was removed (hysterectomy) and you have not had cancer.      You should be tested each year for STDs (sexually transmitted diseases), if you're at risk.     Ask your doctor if you should have a mammogram.      Have a colonoscopy (test for colon cancer) if someone in your family has had colon cancer or polyps before age 50.       Have a cholesterol test every 5 years.       Have a diabetes test (fasting glucose) after age 45. If you are at risk for diabetes, you should have this test every 3 years.    Shots: Get a flu shot each year. Get a tetanus shot every 10 years.     Nutrition:     Eat at least 5 servings of fruits and vegetables each day.    Eat whole-grain bread, whole-wheat pasta and brown rice instead of white grains and rice.    Get adequate Calcium and Vitamin D.      Lifestyle    Exercise at least 150 minutes a week (an average of 30 minutes a day, 5 days a week). This will help you control your weight and prevent disease.    Limit alcohol to one drink per day.    No smoking.     Wear sunscreen to prevent skin cancer.    See your dentist every six months for an exam and cleaning.      --------------------------------------------------------------------------------------------------------    I would recommend 1000 international unit(s) vitamin D3 daily.

## 2019-02-01 NOTE — LETTER
February 8, 2019    Serena Dickens  [address] [place] [place] [address]    Dear ,  This letter is regarding your recent Pap smear (cervical cancer screening) and Human Papillomavirus (HPV) test.  We are happy to inform you that your Pap smear result is normal. Cervical cancer is closely linked with certain types of HPV. Your results showed no evidence of high-risk HPV.  Therefore we recommend you return in 5 years for your next pap smear and HPV test.  You will still need to return to the clinic every year for an annual exam and other preventive tests.  If you have additional questions regarding this result, please call our registered nurse, Brie at [phone].  Sincerely,    Anjelica Hernández MD/[facility]

## 2019-02-05 LAB
COPATH REPORT: NORMAL
PAP: NORMAL

## 2019-02-06 LAB
FINAL DIAGNOSIS: NORMAL
HPV HR 12 DNA CVX QL NAA+PROBE: NEGATIVE
HPV16 DNA SPEC QL NAA+PROBE: NEGATIVE
HPV18 DNA SPEC QL NAA+PROBE: NEGATIVE
SPECIMEN DESCRIPTION: NORMAL
SPECIMEN SOURCE CVX/VAG CYTO: NORMAL

## 2019-02-14 DIAGNOSIS — E61.1 LOW IRON: ICD-10-CM

## 2019-02-14 DIAGNOSIS — Z13.6 CARDIOVASCULAR SCREENING; LDL GOAL LESS THAN 160: ICD-10-CM

## 2019-02-14 DIAGNOSIS — Z13.1 SCREENING FOR DIABETES MELLITUS: ICD-10-CM

## 2019-02-14 LAB
CHOLEST SERPL-MCNC: 206 MG/DL
ERYTHROCYTE [DISTWIDTH] IN BLOOD BY AUTOMATED COUNT: 11.8 % (ref 10–15)
FERRITIN SERPL-MCNC: 14 NG/ML (ref 12–150)
GLUCOSE SERPL-MCNC: 97 MG/DL (ref 70–99)
HCT VFR BLD AUTO: 41.6 % (ref 35–47)
HDLC SERPL-MCNC: 42 MG/DL
HGB BLD-MCNC: 13.4 G/DL (ref 11.7–15.7)
LDLC SERPL CALC-MCNC: 151 MG/DL
MCH RBC QN AUTO: 29.9 PG (ref 26.5–33)
MCHC RBC AUTO-ENTMCNC: 32.2 G/DL (ref 31.5–36.5)
MCV RBC AUTO: 93 FL (ref 78–100)
NONHDLC SERPL-MCNC: 164 MG/DL
PLATELET # BLD AUTO: 234 10E9/L (ref 150–450)
RBC # BLD AUTO: 4.48 10E12/L (ref 3.8–5.2)
TRIGL SERPL-MCNC: 66 MG/DL
WBC # BLD AUTO: 4.9 10E9/L (ref 4–11)

## 2019-02-14 PROCEDURE — 85027 COMPLETE CBC AUTOMATED: CPT | Performed by: FAMILY MEDICINE

## 2019-02-14 PROCEDURE — 80061 LIPID PANEL: CPT | Performed by: FAMILY MEDICINE

## 2019-02-14 PROCEDURE — 82728 ASSAY OF FERRITIN: CPT | Performed by: FAMILY MEDICINE

## 2019-02-14 PROCEDURE — 82947 ASSAY GLUCOSE BLOOD QUANT: CPT | Performed by: FAMILY MEDICINE

## 2019-02-14 PROCEDURE — 36415 COLL VENOUS BLD VENIPUNCTURE: CPT | Performed by: FAMILY MEDICINE

## 2019-04-22 ENCOUNTER — TRANSFERRED RECORDS (OUTPATIENT)
Dept: HEALTH INFORMATION MANAGEMENT | Facility: CLINIC | Age: 44
End: 2019-04-22

## 2019-10-24 ENCOUNTER — TRANSFERRED RECORDS (OUTPATIENT)
Dept: HEALTH INFORMATION MANAGEMENT | Facility: CLINIC | Age: 44
End: 2019-10-24

## 2019-11-19 ENCOUNTER — TRANSFERRED RECORDS (OUTPATIENT)
Dept: HEALTH INFORMATION MANAGEMENT | Facility: CLINIC | Age: 44
End: 2019-11-19

## 2019-12-09 ENCOUNTER — TRANSFERRED RECORDS (OUTPATIENT)
Dept: HEALTH INFORMATION MANAGEMENT | Facility: CLINIC | Age: 44
End: 2019-12-09

## 2025-05-14 ENCOUNTER — TRANSFERRED RECORDS (OUTPATIENT)
Dept: MULTI SPECIALTY CLINIC | Facility: CLINIC | Age: 50
End: 2025-05-14
Payer: COMMERCIAL

## 2025-05-14 LAB
ALT SERPL-CCNC: 23 IU/L (ref 0–32)
AST SERPL-CCNC: 25 IU/L (ref 0–40)
CREATININE (EXTERNAL): 0.76 MG/DL (ref 0.57–1)
GFR ESTIMATED (EXTERNAL): 96 ML/MIN/1.73

## 2025-05-15 ENCOUNTER — TRANSFERRED RECORDS (OUTPATIENT)
Dept: ADMINISTRATIVE | Facility: CLINIC | Age: 50
End: 2025-05-15
Payer: COMMERCIAL

## 2025-05-16 NOTE — PROCEDURES
05/15/2025        Miliite Gebremichael   [address] [place] [address]      Miliite Gebremichael,  :  1975    I am writing to let you know the results of the tests that were done the other day.   Thank you for allowing [facility] the opportunity to take part in your healthcare.  At [facility] we strive to provide each patient with the finest gastroenterology care available.  We hope your experience was pleasant and informative.    Creatinine 2025 09:12   Description Result Units Flags Range   Creatinine 0.76 mg/dL  0.57-1.00   eGFR 96 mL/min/1.73  >59   Comments   Performed At: [place], [facility] [address] [place], [place], [id number]  Marcela Lees MD, Phone: [phone]   AFP, Serum, Tumor Marker 2025 09:12   Description Result Units Flags Range   AFP, Serum, Tumor Marker 2.5 ng/mL  0.0-6.4   Comments   Performed At: [facility], [facility] [address], [place], [id number]  Marcela Lees MD, Phone: [phone]   AFP, Serum, Tumor Marker:  Roche [employer]ostics Electrochemiluminescence Immunoassay (ECLIA)                                                                       .  Values obtained with different assay methods or kits cannot be  used interchangeably.  Results cannot be interpreted as absolute  evidence of the presence or absence of malignant disease.                                                                       .  This test is not interpretable in pregnant females.   Hepatic Function Panel (7) 2025 09:12   Description Result Units Flags Range   Bilirubin, Total 0.6 mg/dL  0.0-1.2   Bilirubin, Direct 0.16 mg/dL  0.00-0.40   AST (SGOT) 25 IU/L  0-40   ALT (SGPT) 23 IU/L  0-32   Albumin 4.3 g/dL  3.9-4.9   Alkaline Phosphatase 103 IU/L     Protein, Total 7.3 g/dL  6.0-8.5   Comments   Performed At: [place], [facility] [address] [place], [place], [id number]  Marcela Lees MD, Phone: [phone]         Please call sooner if you have a problem,  otherwise I'll see you as we had previously scheduled.  If symptoms persist or if you have questions regarding your results, please call our office.       Thank you.    Electronically signed by:  Juanjo Marley MD 05/15/2025 12:03 PM  Document generated by:  Juanjo Marley MD  05/15/2025  If your provider ordered multiple tests; the results may not become available at the same time.  If multiple test results are received within 14 days of one another, you may receive a duplicate.  cc:  Ash Alfonso MD     Orthopedic

## 2025-05-18 ENCOUNTER — TRANSFERRED RECORDS (OUTPATIENT)
Dept: ADMINISTRATIVE | Facility: CLINIC | Age: 50
End: 2025-05-18
Payer: COMMERCIAL

## 2025-05-20 NOTE — PROCEDURES
2025        Miliite Gebremichael   [address]  [place] [address]      Miliite Gebremichael,  :  1975    I am writing to let you know the results of the tests that were done the other day.   Thank you for allowing [facility] the opportunity to take part in your healthcare.  At [facility] we strive to provide each patient with the finest gastroenterology care available.  We hope your experience was pleasant and informative.    You ultrasound shows NO evidence of liver cancer.  Creatinine 2025 09:12   Description Result Units Flags Range   Creatinine 0.76 mg/dL  0.57-1.00   eGFR 96 mL/min/1.73  >59   Comments   Performed At: [place], [facility] [address] [place], [place], [id number]  Marcela Lees MD, Phone: [phone]   AFP, Serum, Tumor Marker 2025 09:12   Description Result Units Flags Range   AFP, Serum, Tumor Marker 2.5 ng/mL  0.0-6.4   Comments   Performed At: [facility], [facility] [address] [place], [place], [id number]  Marcela Lees MD, Phone: [phone]   AFP, Serum, Tumor Marker:  Roche [employer]ostics Electrochemiluminescence Immunoassay (ECLIA)                                                                       .  Values obtained with different assay methods or kits cannot be  used interchangeably.  Results cannot be interpreted as absolute  evidence of the presence or absence of malignant disease.                                                                       .  This test is not interpretable in pregnant females.   Hepatic Function Panel (7) 2025 09:12   Description Result Units Flags Range   Bilirubin, Total 0.6 mg/dL  0.0-1.2   Bilirubin, Direct 0.16 mg/dL  0.00-0.40   AST (SGOT) 25 IU/L  0-40   ALT (SGPT) 23 IU/L  0-32   Albumin 4.3 g/dL  3.9-4.9   Alkaline Phosphatase 103 IU/L     Protein, Total 7.3 g/dL  6.0-8.5   Comments   Performed At: [facility] [address] [place], [place], [id number]  Marcela Lees MD, Phone: [phone]      HBV Real-Time PCR, Quant 05/14/2025 09:12   Description Result Units Flags Range   HBV IU/mL HBV DNA not detected IU/mL     log10 HBV IU/mL TNP log10 IU/mL     Test Information: Comment      Comments   Performed At: [facility] [place] [address], [place], [place], [id number]  Marcela Lees MD, Phone: [phone]   log10 HBV IU/mL:  Unable to calculate result since non-numeric result obtained for  component test.   Test Information::  The reportable range for this assay is 10 IU/mL to 1 billion IU/mL.         Please call sooner if you have a problem, otherwise I'll see you as we had previously scheduled.  If symptoms persist or if you have questions regarding your results, please call our office.       Thank you.    Electronically signed by:  Juanjo Marley MD 05/18/2025 09:38 PM  Document generated by:  Juanjo Marley MD  05/18/2025  If your provider ordered multiple tests; the results may not become available at the same time.  If multiple test results are received within 14 days of one another, you may receive a duplicate.  cc:  Ash Alfonso MD